# Patient Record
(demographics unavailable — no encounter records)

---

## 2025-02-26 NOTE — HISTORY OF PRESENT ILLNESS
[Patient reported PAP Smear was normal] : Patient reported PAP Smear was normal [Normal Amount/Duration] :  normal amount and duration [Regular Cycle Intervals] : periods have been regular [TextBox_4] :  ID [id number] annual exam, Paragard in place, monthly menses 3 days, heavy. Happy with contraceptive method No other gyn concerns reported c/o constipation IUD seen appropriately positioned on TA sono in uterine cavity [PapSmeardate] : 12/9/22 [FreeTextEntry1] : 2/21/25 [Currently Active] : currently active [Men] : men [No] : No

## 2025-02-26 NOTE — PHYSICAL EXAM
[Chaperone Present] : A chaperone was present in the examining room during all aspects of the physical examination [FreeTextEntry2] : Nirmala [Appropriately responsive] : appropriately responsive [Soft] : soft [Oriented x3] : oriented x3 [Examination Of The Breasts] : a normal appearance [No Masses] : no breast masses were palpable [Labia Majora] : normal [Labia Minora] : normal [IUD String] : an IUD string was noted [Normal] : normal [Uterine Adnexae] : normal [FreeTextEntry6] : anteversion